# Patient Record
Sex: FEMALE | Race: WHITE | NOT HISPANIC OR LATINO | Employment: OTHER | URBAN - METROPOLITAN AREA
[De-identification: names, ages, dates, MRNs, and addresses within clinical notes are randomized per-mention and may not be internally consistent; named-entity substitution may affect disease eponyms.]

---

## 2019-03-07 ENCOUNTER — HISTORICAL (OUTPATIENT)
Dept: PREADMISSION TESTING | Facility: HOSPITAL | Age: 64
End: 2019-03-07

## 2019-03-07 LAB
ABS NEUT (OLG): 5.48 X10(3)/MCL (ref 2.1–9.2)
ALBUMIN SERPL-MCNC: 4.2 GM/DL (ref 3.4–5)
ALBUMIN/GLOB SERPL: 1.6 {RATIO}
ALP SERPL-CCNC: 80 UNIT/L (ref 38–126)
ALT SERPL-CCNC: 24 UNIT/L (ref 12–78)
APTT PPP: 28.1 SECOND(S) (ref 24.8–36.9)
AST SERPL-CCNC: 15 UNIT/L (ref 15–37)
BASOPHILS # BLD AUTO: 0.1 X10(3)/MCL (ref 0–0.2)
BASOPHILS NFR BLD AUTO: 1 %
BILIRUB SERPL-MCNC: 2 MG/DL (ref 0.2–1)
BILIRUBIN DIRECT+TOT PNL SERPL-MCNC: 0.3 MG/DL (ref 0–0.2)
BILIRUBIN DIRECT+TOT PNL SERPL-MCNC: 1.7 MG/DL (ref 0–0.8)
BUN SERPL-MCNC: 12 MG/DL (ref 7–18)
CALCIUM SERPL-MCNC: 8.9 MG/DL (ref 8.5–10.1)
CHLORIDE SERPL-SCNC: 105 MMOL/L (ref 98–107)
CO2 SERPL-SCNC: 29 MMOL/L (ref 21–32)
CREAT SERPL-MCNC: 0.6 MG/DL (ref 0.55–1.02)
EOSINOPHIL # BLD AUTO: 0.1 X10(3)/MCL (ref 0–0.9)
EOSINOPHIL NFR BLD AUTO: 2 %
ERYTHROCYTE [DISTWIDTH] IN BLOOD BY AUTOMATED COUNT: 13.5 % (ref 11.5–17)
GLOBULIN SER-MCNC: 2.6 GM/DL (ref 2.4–3.5)
GLUCOSE SERPL-MCNC: 83 MG/DL (ref 74–106)
HCT VFR BLD AUTO: 41.7 % (ref 37–47)
HGB BLD-MCNC: 13.4 GM/DL (ref 12–16)
INR PPP: 0.9 (ref 0–1.3)
LYMPHOCYTES # BLD AUTO: 1.4 X10(3)/MCL (ref 0.6–4.6)
LYMPHOCYTES NFR BLD AUTO: 18 %
MCH RBC QN AUTO: 30.1 PG (ref 27–31)
MCHC RBC AUTO-ENTMCNC: 32.1 GM/DL (ref 33–36)
MCV RBC AUTO: 93.7 FL (ref 80–94)
MONOCYTES # BLD AUTO: 0.8 X10(3)/MCL (ref 0.1–1.3)
MONOCYTES NFR BLD AUTO: 10 %
NEUTROPHILS # BLD AUTO: 5.48 X10(3)/MCL (ref 2.1–9.2)
NEUTROPHILS NFR BLD AUTO: 69 %
PLATELET # BLD AUTO: 230 X10(3)/MCL (ref 130–400)
PMV BLD AUTO: 10.8 FL (ref 9.4–12.4)
POTASSIUM SERPL-SCNC: 4.1 MMOL/L (ref 3.5–5.1)
PROT SERPL-MCNC: 6.8 GM/DL (ref 6.4–8.2)
PROTHROMBIN TIME: 12.3 SECOND(S) (ref 12.2–14.7)
RBC # BLD AUTO: 4.45 X10(6)/MCL (ref 4.2–5.4)
SODIUM SERPL-SCNC: 140 MMOL/L (ref 136–145)
WBC # SPEC AUTO: 7.9 X10(3)/MCL (ref 4.5–11.5)

## 2019-03-22 ENCOUNTER — HISTORICAL (OUTPATIENT)
Dept: ADMINISTRATIVE | Facility: HOSPITAL | Age: 64
End: 2019-03-22

## 2022-04-30 NOTE — OP NOTE
DATE OF SURGERY:        SURGEON:  Raul Eldridge MD  ASSISTANT:  Bobbi Drew    PREOPERATIVE DIAGNOSIS:  Aging face.    POSTOPERATIVE DIAGNOSIS:  Aging face.    PROCEDURE:  Upper lid blepharoplasty, transconjunctival lower lid blepharoplasty with fat preservation, lower lid tightening, cervical facial liposuction, platysmaplasty, rhytidectomy.    PROCEDURE IN DETAIL:  The patient was brought to the operating room and placed in supine position.  After achieving general endotracheal anesthesia, a combination of 1% lidocaine with 1:100,000 epinephrine was injected into the soft tissue marked for undermining.  The face was prepped and draped for procedure.     We turned our attention to the upper lid blepharoplasty where corneal protectors were placed to protect both eyes.  A predetermined amount of upper eyelid skin was removed using sharp dissection.  A strip of orbicularis oculi muscle was taken and hemostasis was achieved using Bipolar cautery.  The fat in the nasal compartment was removed using the clamp, cut and cautery technique and the lids were closed with 6-0 nylon in a running fashion.  This procedure was done bilaterally.     We turned out attention to the lower lids.  A corneal protector was used to protect the globe at all times.  A [place] tipped needle of the Bovie was used to make a trans conjunctival incision going through the conjunctiva and with lower lid retractors. Upon doing so, fat in all three compartments were isolated.  Using clamp, cut, cautery technique, fat in the central and lateral compartments was removed. The fat in the nasal most compartment was then used to transpose beneath the orbicularis muscle and a supraperiosteal plane in the region of the nasal jugal fold tear trough. This was done by using a [place] tip needle incising the orbicularis off the rim, then a Morehouse elevator was used to dissect the pocket for fat transplantation. Once this was done, a 5.0 prolene  was used to go transcutaneous through skin and orbicularis where it was sewn to the fat which was then pulled through and sewn over a bolster of Telfa sponge. This was done bilaterally. Corneal protectors were removed. The eyes were flushed with bacteriostatic saline and Lacrilube applied. Cool compresses were then applied.     At the completion of the lower lid blepharoplasty, an incision was made in the crow's feet bilaterally.  Skin and orbicularis flap were elevated for a short distance in the crow's feet.  Redundant skin and orbicularis were removed.  Then the orbicularis was plicated to the lateral orbital rim.  Periosteum with 5-0 PDS suture.  The wound was closed with 5-0 fast-absorbing gut suture in interrupted fashion.     The face and neck were prepped and draped for a facelift surgery.  Once this was completed, facelift incisions were made in the pre-auricular area as well as the post-auricular areas and out to the occipital scalp, as well as the small incision that was made in the submental region.       With this, close liposuction was done by making tunnels prior to turning the vacuum going extensively into the submental region as well as into the jowls bilaterally.  Once the tunnels were made, the vacuum was turned on and liposuction was carried out throughout both jowls as well as in the submental region with care being taken to leave the open end of the cannula downward and using a rapid to and fro motion, until an appropriate amount of fat was removed.  Then the medial border of the platysma were grasped and sutured in running lock fashion with 3-0 Vicryl suture.       Once this was done, we turned our attention to the facelift where anterior, inferior and posterior flaps were elevated in the subcutaneous plane.  Hemostasis was achieved using the bipolar cautery.  A limited amount of liposuction was done in the pre-auricular region and infralobular region and hemostasis was achieved using the  bipolar cautery.  Using a 2-0 Prolene suture the posterior edge of the platysma was sutured to mastoid periosteum.  Using 2-0 PDS sutures, the SMAS was plicated.  Once this was done, the excess skin in front of the ears was pulled anteriorly and behind the ear posteriorly and the excess skin was removed.  The skin incisions in the scalp were closed with clips while the pre-auricular incisions was closed with 5-0 fast absorbing gut and the post-auricular incisions were closed with 5-0 plain sutures all in a running lock fashion after spraying Crosseal diffusely over the underlying tissue.  This procedure was done bilaterally.     The patient tolerated the procedure well and was extubated and awakened in the operating room and brought to the recovery room in stable condition.        ______________________________  Raul Eldridge MD    JJJ/[facility]  DD:  03/22/2019  Time:  11:50AM  DT:  03/22/2019  Time:  12:41PM  Job #:  [id number]

## 2022-11-28 ENCOUNTER — OFFICE VISIT (OUTPATIENT)
Dept: URGENT CARE | Facility: CLINIC | Age: 67
End: 2022-11-28
Payer: MEDICARE

## 2022-11-28 VITALS
HEIGHT: 69 IN | TEMPERATURE: 99 F | HEART RATE: 76 BPM | WEIGHT: 124 LBS | OXYGEN SATURATION: 98 % | DIASTOLIC BLOOD PRESSURE: 89 MMHG | RESPIRATION RATE: 18 BRPM | BODY MASS INDEX: 18.37 KG/M2 | SYSTOLIC BLOOD PRESSURE: 144 MMHG

## 2022-11-28 DIAGNOSIS — R09.89 RUNNY NOSE: ICD-10-CM

## 2022-11-28 DIAGNOSIS — J06.9 VIRAL UPPER RESPIRATORY TRACT INFECTION: ICD-10-CM

## 2022-11-28 DIAGNOSIS — R09.81 NASAL CONGESTION: Primary | ICD-10-CM

## 2022-11-28 LAB
CTP QC/QA: YES
CTP QC/QA: YES
FLUAV AG NPH QL: NEGATIVE
FLUBV AG NPH QL: NEGATIVE
SARS-COV-2 RDRP RESP QL NAA+PROBE: NEGATIVE

## 2022-11-28 PROCEDURE — 99203 PR OFFICE/OUTPT VISIT, NEW, LEVL III, 30-44 MIN: ICD-10-PCS | Mod: 25,,, | Performed by: NURSE PRACTITIONER

## 2022-11-28 PROCEDURE — 87635 SARS-COV-2 COVID-19 AMP PRB: CPT | Mod: QW,CR,, | Performed by: NURSE PRACTITIONER

## 2022-11-28 PROCEDURE — 96372 THER/PROPH/DIAG INJ SC/IM: CPT | Mod: ,,, | Performed by: NURSE PRACTITIONER

## 2022-11-28 PROCEDURE — 96372 PR INJECTION,THERAP/PROPH/DIAG2ST, IM OR SUBCUT: ICD-10-PCS | Mod: ,,, | Performed by: NURSE PRACTITIONER

## 2022-11-28 PROCEDURE — 87804 POCT INFLUENZA A/B: ICD-10-PCS | Mod: 59,QW,, | Performed by: NURSE PRACTITIONER

## 2022-11-28 PROCEDURE — 87804 INFLUENZA ASSAY W/OPTIC: CPT | Mod: QW,,, | Performed by: NURSE PRACTITIONER

## 2022-11-28 PROCEDURE — 99203 OFFICE O/P NEW LOW 30 MIN: CPT | Mod: 25,,, | Performed by: NURSE PRACTITIONER

## 2022-11-28 PROCEDURE — 87635: ICD-10-PCS | Mod: QW,CR,, | Performed by: NURSE PRACTITIONER

## 2022-11-28 RX ORDER — BETAMETHASONE SODIUM PHOSPHATE AND BETAMETHASONE ACETATE 3; 3 MG/ML; MG/ML
6 INJECTION, SUSPENSION INTRA-ARTICULAR; INTRALESIONAL; INTRAMUSCULAR; SOFT TISSUE
Status: COMPLETED | OUTPATIENT
Start: 2022-11-28 | End: 2022-11-28

## 2022-11-28 RX ADMIN — BETAMETHASONE SODIUM PHOSPHATE AND BETAMETHASONE ACETATE 6 MG: 3; 3 INJECTION, SUSPENSION INTRA-ARTICULAR; INTRALESIONAL; INTRAMUSCULAR; SOFT TISSUE at 02:11

## 2022-11-28 NOTE — PROGRESS NOTES
"Subjective:       Patient ID: Marissa Lovell is a 66 y.o. female.    Vitals:  height is 5' 9" (1.753 m) and weight is 56.2 kg (124 lb). Her temperature is 98.7 °F (37.1 °C). Her blood pressure is 144/89 (abnormal) and her pulse is 76. Her respiration is 18 and oxygen saturation is 98%.     Chief Complaint: No chief complaint on file.    Sinus issues, cough, nasal congestion, runny nose green mucus x4w Nyquil and Dayquil     HENT:  Positive for congestion, sinus pain and sinus pressure.    Respiratory:  Positive for cough.      Objective:      Physical Exam   Constitutional: She is oriented to person, place, and time. She appears well-developed. She is cooperative.  Non-toxic appearance. She does not appear ill. No distress.   HENT:   Head: Normocephalic and atraumatic.   Ears:   Right Ear: Hearing, tympanic membrane, external ear and ear canal normal.   Left Ear: Hearing, tympanic membrane, external ear and ear canal normal.   Nose: Nose normal. No mucosal edema, rhinorrhea or nasal deformity. No epistaxis. Right sinus exhibits no maxillary sinus tenderness and no frontal sinus tenderness. Left sinus exhibits no maxillary sinus tenderness and no frontal sinus tenderness.   Mouth/Throat: Uvula is midline, oropharynx is clear and moist and mucous membranes are normal. No trismus in the jaw. Normal dentition. No uvula swelling. No oropharyngeal exudate, posterior oropharyngeal edema or posterior oropharyngeal erythema.   Eyes: Conjunctivae and lids are normal. No scleral icterus.   Neck: Trachea normal and phonation normal. Neck supple. No edema present. No erythema present. No neck rigidity present.   Cardiovascular: Normal rate, regular rhythm, normal heart sounds and normal pulses.   Pulmonary/Chest: Effort normal and breath sounds normal. No respiratory distress. She has no decreased breath sounds. She has no rhonchi.   Abdominal: Normal appearance.   Musculoskeletal: Normal range of motion.         General: No " deformity. Normal range of motion.   Neurological: She is alert and oriented to person, place, and time. She exhibits normal muscle tone. Coordination normal.   Skin: Skin is warm, dry, intact, not diaphoretic and not pale.   Psychiatric: Her speech is normal and behavior is normal. Judgment and thought content normal.   Nursing note and vitals reviewed.      Assessment:       1. Nasal congestion    2. Runny nose    3. Viral upper respiratory tract infection        Office Visit on 11/28/2022   Component Date Value Ref Range Status    POC Rapid COVID 11/28/2022 Negative  Negative Final     Acceptable 11/28/2022 Yes   Final    Rapid Influenza A Ag 11/28/2022 Negative  Negative Final    Rapid Influenza B Ag 11/28/2022 Negative  Negative Final     Acceptable 11/28/2022 Yes   Final        Plan:     Nasal saline, Flonase nasal spray, Claritin OTC as directed  take Mucinex OTC for cough as directed   follow up with your primary care provider within 2-5 days if your signs and symptoms have not resolved or worsen.   If condition worsens or fails to improve we recommend that you receive another evaluation with your primary medical clinic to discuss your concerns.       Nasal congestion  -     POCT COVID-19 Rapid Screening  -     POCT Influenza A/B    Runny nose  -     POCT COVID-19 Rapid Screening  -     POCT Influenza A/B    Viral upper respiratory tract infection  -     betamethasone acetate-betamethasone sodium phosphate injection 6 mg

## 2022-11-28 NOTE — PATIENT INSTRUCTIONS
Nasal saline, Flonase nasal spray, Claritin OTC as directed  take Mucinex OTC as directed for cough   follow up with your primary care provider within 2-5 days if your signs and symptoms have not resolved or worsen.   If condition worsens or fails to improve we recommend that you receive another evaluation with your primary medical clinic to discuss your concerns.

## 2023-04-03 ENCOUNTER — HOSPITAL ENCOUNTER (OUTPATIENT)
Dept: RADIOLOGY | Facility: CLINIC | Age: 68
Discharge: HOME | End: 2023-04-03
Attending: STUDENT IN AN ORGANIZED HEALTH CARE EDUCATION/TRAINING PROGRAM
Payer: MEDICARE

## 2023-04-03 ENCOUNTER — OFFICE VISIT (OUTPATIENT)
Dept: ORTHOPEDICS | Facility: CLINIC | Age: 68
End: 2023-04-03
Payer: MEDICARE

## 2023-04-03 DIAGNOSIS — M25.532 LEFT WRIST PAIN: ICD-10-CM

## 2023-04-03 DIAGNOSIS — M65.4 DE QUERVAIN'S TENOSYNOVITIS, LEFT: Primary | ICD-10-CM

## 2023-04-03 PROCEDURE — 99203 OFFICE O/P NEW LOW 30 MIN: CPT | Mod: 25,,, | Performed by: STUDENT IN AN ORGANIZED HEALTH CARE EDUCATION/TRAINING PROGRAM

## 2023-04-03 PROCEDURE — 73110 XR WRIST COMPLETE 3 VIEWS LEFT: ICD-10-PCS | Mod: LT,,, | Performed by: STUDENT IN AN ORGANIZED HEALTH CARE EDUCATION/TRAINING PROGRAM

## 2023-04-03 PROCEDURE — 20550 TENDON SHEATH: ICD-10-PCS | Mod: LT,,, | Performed by: STUDENT IN AN ORGANIZED HEALTH CARE EDUCATION/TRAINING PROGRAM

## 2023-04-03 PROCEDURE — 20550 NJX 1 TENDON SHEATH/LIGAMENT: CPT | Mod: LT,,, | Performed by: STUDENT IN AN ORGANIZED HEALTH CARE EDUCATION/TRAINING PROGRAM

## 2023-04-03 PROCEDURE — 73110 X-RAY EXAM OF WRIST: CPT | Mod: LT,,, | Performed by: STUDENT IN AN ORGANIZED HEALTH CARE EDUCATION/TRAINING PROGRAM

## 2023-04-03 PROCEDURE — 99203 PR OFFICE/OUTPT VISIT, NEW, LEVL III, 30-44 MIN: ICD-10-PCS | Mod: 25,,, | Performed by: STUDENT IN AN ORGANIZED HEALTH CARE EDUCATION/TRAINING PROGRAM

## 2023-04-03 RX ADMIN — BETAMETHASONE SODIUM PHOSPHATE AND BETAMETHASONE ACETATE 6 MG: 3; 3 INJECTION, SUSPENSION INTRA-ARTICULAR; INTRALESIONAL; INTRAMUSCULAR; SOFT TISSUE at 02:04

## 2023-04-03 RX ADMIN — LIDOCAINE HYDROCHLORIDE 1 ML: 10 INJECTION INFILTRATION; PERINEURAL at 02:04

## 2023-04-07 NOTE — PROGRESS NOTES
Chief Complaint:  Wrist pain    Consulting Physician: No ref. provider found    History of present illness:    Patient is a 67-year-old right-hand-dominant female who presents for initial evaluation of her left wrist pain.  She states it started a few months ago possibly due to overuse of her left wrist.  She complains of pain and swelling over the radial aspect of the left wrist.  It does not radiate.  It is an achy pain.  No numbness or tingling to the fingertips.  It is worse with motion.  She is not tried a brace.  She is never had an injection.  She takes over-the-counter Tylenol helps some.  He is not had any surgery on his wrist    History reviewed. No pertinent past medical history.    Past Surgical History:   Procedure Laterality Date    FRACTURE SURGERY         No current outpatient medications on file.     No current facility-administered medications for this visit.       Review of patient's allergies indicates:   Allergen Reactions    Sulfa (sulfonamide antibiotics) Anxiety       Family History   Problem Relation Age of Onset    Heart disease Father        Social History     Socioeconomic History    Marital status:    Tobacco Use    Smoking status: Never    Smokeless tobacco: Never   Substance and Sexual Activity    Alcohol use: Yes     Alcohol/week: 2.0 standard drinks     Types: 2 Glasses of wine per week    Drug use: Never       Review of Systems:    Constitution:   Denies chills, fever, and sweats.  HENT:   Denies headaches or blurry vision.  Cardiovascular:  Denies chest pain or irregular heart beat.  Respiratory:   Denies cough or shortness of breath.  Gastrointestinal:  Denies abdominal pain, nausea, or vomiting.  Musculoskeletal:   Denies muscle cramps.  Neurological:   Denies dizziness or focal weakness.  Psychiatric/Behavior: Normal mental status.  Hematology/Lymph:  Denies bleeding problem or easy bruising/bleeding.  Skin:    Denies rash or suspicious lesions.    Examination:    Vital  Signs:    Vitals:    04/03/23 1513   PainSc:   5       There is no height or weight on file to calculate BMI.    Constitution:   Well-developed, well nourished patient in no acute distress.  Neurological:   Alert and oriented x 3 and cooperative to examination.     Psychiatric/Behavior: Normal mental status.  Respiratory:   No shortness of breath.  Eyes:    Extraoccular muscles intact  Skin:    No scars, rash or suspicious lesions.    MSK:   Left wrist:  No open wounds or rashes.  Tenderness to palpation of the 1st dorsal compartment.  No tenderness to palpation over the intersection of the 1st 2nd dorsal compartments.  No tenderness to palpation of the thumb CMC joint.  No tenderness to palpation of the A1 pulley of the thumb.  She is able to make a fist and extend her digits fully.  Finkelstein's test is positive.  Grind test is negative.  She is neurovascularly intact.  Radial pulses 2+ hand is warm well perfused    Imaging:   X-ray left wrist shows no fractures or dislocations     Assessment:  Left de Quervain tenosynovitis    Plan:  I will give her a forearm based thumb spica brace today as well as an injection to 1st dorsal compartment.  She can work on range of motion exercises at home.  See her back as needed if her pain continues to worsen    Follow Up:  As needed  Xray at next visit: None

## 2023-04-10 RX ORDER — LIDOCAINE HYDROCHLORIDE 10 MG/ML
1 INJECTION INFILTRATION; PERINEURAL
Status: DISCONTINUED | OUTPATIENT
Start: 2023-04-03 | End: 2023-04-10 | Stop reason: HOSPADM

## 2023-04-10 RX ORDER — BETAMETHASONE SODIUM PHOSPHATE AND BETAMETHASONE ACETATE 3; 3 MG/ML; MG/ML
6 INJECTION, SUSPENSION INTRA-ARTICULAR; INTRALESIONAL; INTRAMUSCULAR; SOFT TISSUE
Status: DISCONTINUED | OUTPATIENT
Start: 2023-04-03 | End: 2023-04-10 | Stop reason: HOSPADM

## 2023-04-10 NOTE — PROCEDURES
Tendon Sheath    Date/Time: 4/3/2023 2:00 PM  Performed by: Samm Vega MD  Authorized by: Samm Vega MD     Consent Done?:  Yes (Verbal)  Indications:  Pain  Timeout: prior to procedure the correct patient, procedure, and site was verified    Location:  Wrist  Site:  L first doral compartment  Needle size:  25 G  Approach:  Radial  Medications:  1 mL LIDOcaine HCL 10 mg/ml (1%) 10 mg/mL (1 %); 6 mg betamethasone acetate-betamethasone sodium phosphate 6 mg/mL  Patient tolerance:  Patient tolerated the procedure well with no immediate complications

## 2023-09-07 ENCOUNTER — PATIENT MESSAGE (OUTPATIENT)
Dept: RESEARCH | Facility: HOSPITAL | Age: 68
End: 2023-09-07
Payer: MEDICARE

## 2025-08-23 ENCOUNTER — PATIENT MESSAGE (OUTPATIENT)
Dept: RESEARCH | Facility: HOSPITAL | Age: 70
End: 2025-08-23
Payer: MEDICARE